# Patient Record
Sex: FEMALE | Race: WHITE | NOT HISPANIC OR LATINO | Employment: FULL TIME | URBAN - METROPOLITAN AREA
[De-identification: names, ages, dates, MRNs, and addresses within clinical notes are randomized per-mention and may not be internally consistent; named-entity substitution may affect disease eponyms.]

---

## 2024-04-11 ENCOUNTER — OFFICE VISIT (OUTPATIENT)
Dept: PULMONOLOGY | Facility: CLINIC | Age: 39
End: 2024-04-11
Payer: COMMERCIAL

## 2024-04-11 VITALS
HEIGHT: 69 IN | HEART RATE: 79 BPM | OXYGEN SATURATION: 99 % | WEIGHT: 178.13 LBS | RESPIRATION RATE: 20 BRPM | DIASTOLIC BLOOD PRESSURE: 70 MMHG | BODY MASS INDEX: 26.38 KG/M2 | SYSTOLIC BLOOD PRESSURE: 122 MMHG

## 2024-04-11 DIAGNOSIS — R06.09 DYSPNEA ON EXERTION: ICD-10-CM

## 2024-04-11 DIAGNOSIS — K21.9 GERD WITHOUT ESOPHAGITIS: Primary | ICD-10-CM

## 2024-04-11 PROCEDURE — 99204 OFFICE O/P NEW MOD 45 MIN: CPT | Mod: S$GLB,,, | Performed by: INTERNAL MEDICINE

## 2024-04-11 PROCEDURE — 99999 PR PBB SHADOW E&M-EST. PATIENT-LVL IV: CPT | Mod: PBBFAC,,, | Performed by: INTERNAL MEDICINE

## 2024-04-11 RX ORDER — SERTRALINE HYDROCHLORIDE 100 MG/1
100 TABLET, FILM COATED ORAL
COMMUNITY
Start: 2024-03-21

## 2024-04-11 RX ORDER — PANTOPRAZOLE SODIUM 40 MG/1
40 TABLET, DELAYED RELEASE ORAL EVERY MORNING
Qty: 90 TABLET | Refills: 3 | Status: SHIPPED | OUTPATIENT
Start: 2024-04-11

## 2024-04-11 RX ORDER — TRAZODONE HYDROCHLORIDE 100 MG/1
100 TABLET ORAL NIGHTLY
COMMUNITY
Start: 2024-01-31

## 2024-04-11 RX ORDER — FAMOTIDINE 40 MG/1
40 TABLET, FILM COATED ORAL NIGHTLY
Qty: 30 TABLET | Refills: 11 | Status: SHIPPED | OUTPATIENT
Start: 2024-04-11

## 2024-04-11 NOTE — PROGRESS NOTES
Subjective:      Patient ID: Jennifer Claudet is a 39 y.o. female.    Chief Complaint: Shortness of Breath    Shortness of Breath        39-year-old female status post partial thyroidectomy for papillary thyroid carcinoma with subsequent right vocal cord paralysis previously followed by Dr. Casper at our [facility].  In 2022 she had medialization and a silastic strut placed in the right vocal cord.  Was also previously seen by Dr. Frankel at [facility] for possible asthma and chronic dyspnea.  She had pulmonary function testing done at that time in 2021 which was read as mild obstructive changes.  She also has some sleep issues and had a sleep study which was essentially normal in 2022.  She is here today for another opinion regarding her dyspnea which has gotten significantly worse by her description for the past 2 years.  Dyspnea occurs both at rest and with exertion but sometimes worse at rest.  Especially worse with lying supine at night feels like she can not get a deep breath and has to forcefully inhale deeply and yawn.  Was previously treated with inhaled bronchodilators which did not help.  She is also worked with physical therapy at our [facility] for this problem without much benefit.  No associated wheezing, cough, fever, chills, chest pain.    Past Medical History:   Diagnosis Date    Anxiety     Anxiety state 11/2/2020 8:52:48 AM    [facility] Historical - Quick Add: Anxiety and depression-No Additional Notes    Anxiety state 11/2/2020 8:52:48 AM    [facility] Historical - Quick Add: Anxiety and depression-No Additional Notes    Depression     Hypothyroid     Hypothyroidism 6/12/2018 2:11:13 PM    [facility] - [facility]: Hypothyroidism-No Additional Notes    Hypothyroidism 6/12/2018 2:11:13 PM    [facility] - [facility]: Hypothyroidism-No Additional Notes    Irritable colon 11/2/2020 8:53:24 AM    [facility] Historical - Unknown: IBS (irritable bowel  syndrome)-No Additional Notes    Irritable colon 11/2/2020 8:53:24 AM    [facility] Historical - Unknown: IBS (irritable bowel syndrome)-No Additional Notes    Migraine 11/2/2020 8:53:19 AM    [facility] Historical - Other: Migraine headache-No Additional Notes    Migraine 11/2/2020 8:53:19 AM    [facility] Historical - Other: Migraine headache-No Additional Notes    Obsessive-compulsive disorder 11/2/2020 8:53:03 AM    [facility] Historical - Unknown: OCD (obsessive compulsive disorder)-No Additional Notes    Obsessive-compulsive disorder 11/2/2020 8:53:03 AM    [facility] Historical - Unknown: OCD (obsessive compulsive disorder)-No Additional Notes    Other acne 11/2/2020 8:53:33 AM    [facility] - Endocrine/Metabolic/Immune: Acne-No Additional Notes    Other acne 11/2/2020 8:53:33 AM    [facility] - Endocrine/Metabolic/Immune: Acne-No Additional Notes     Past Surgical History:   Procedure Laterality Date    cauterization of endometriosis      ENDOMETRIAL ABLATION      FOOT SURGERY      HIP SURGERY  07/2022    LAPAROSCOPY      SALPINGOOPHORECTOMY Right     THYROIDECTOMY      TUBAL LIGATION      vocal cord surgery  02/2022     Social History     Tobacco Use    Smoking status: Never    Smokeless tobacco: Never   Substance Use Topics    Alcohol use: Yes    Drug use: Never     History reviewed. No pertinent family history.    Review of Systems   Respiratory:  Positive for shortness of breath.     as per hPI otherwise negative    Objective:     Physical Exam   Constitutional: She is oriented to person, place, and time. She appears well-developed. No distress.   HENT:   Head: Normocephalic.   Cardiovascular: Normal rate and regular rhythm.   Pulmonary/Chest: Normal expansion, symmetric chest wall expansion, effort normal and breath sounds normal. She has no wheezes.   Abdominal: Soft.   Musculoskeletal:      Cervical back: Neck supple.   Neurological: She is alert and oriented to  "person, place, and time.   Psychiatric: She has a normal mood and affect.   Nursing note and vitals reviewed.          4/11/2024     8:31 AM 1/8/2024    11:04 AM 10/24/2023     2:00 PM 9/26/2023     8:56 AM 8/7/2023    10:02 AM 1/5/2023     2:57 PM 1/3/2022     2:14 PM   Pulmonary Function Tests   SpO2 99 %         Height 5' 9" (1.753 m) 5' 9" (1.753 m) 5' 9" (1.753 m) 5' 9" (1.753 m) 5' 9" (1.753 m) 5' 9" (1.753 m) 5' 9" (1.753 m)   Weight 80.8 kg (178 lb 2.1 oz) 85.3 kg (188 lb) 85.7 kg (189 lb) 85.7 kg (189 lb) 86.2 kg (190 lb) 85.3 kg (188 lb) 81.2 kg (179 lb)   BMI (Calculated) 26.3 27.8 27.9 27.9 28 27.8 26.4        Assessment:     1. GERD without esophagitis    2. Dyspnea on exertion         Orders Placed This Encounter   Procedures    FL Fluoro of Diaphragm     Standing Status:   Future     Standing Expiration Date:   4/11/2025     Order Specific Question:   Reason for Exam:     Answer:   dyspnea     Order Specific Question:   Is the patient pregnant?     Answer:   No     Order Specific Question:   May the Radiologist modify the order per protocol to meet the clinical needs of the patient?     Answer:   Yes     Order Specific Question:   Release to patient     Answer:   Immediate    Complete PFT w/ bronchodilator     Standing Status:   Future     Standing Expiration Date:   4/11/2025     Order Specific Question:   Release to patient     Answer:   Immediate    Stress test, pulmonary     Standing Status:   Future     Standing Expiration Date:   4/11/2025     Order Specific Question:   Reason for study     Answer:   Functional status     Order Specific Question:   Release to patient     Answer:   Immediate         Plan:     Dyspnea of unclear etiology  Have to wonder if she basically has lost all of her intrinsic PEEP with her vocal cord issues  Unclear if silent reflux may be playing a role as well  We will have her back in 4 weeks for PFTs, 6 minute walk test and diaphragmatic fluoroscopy  In the meantime we " will treat her empirically for reflux with Protonix q.a.m. and Pepcid q.h.s.  Discussed the above with the patient who voiced understanding and agreement with this plan

## 2024-04-16 ENCOUNTER — HOSPITAL ENCOUNTER (OUTPATIENT)
Dept: RADIOLOGY | Facility: HOSPITAL | Age: 39
Discharge: HOME | End: 2024-04-16
Attending: INTERNAL MEDICINE
Payer: COMMERCIAL

## 2024-04-16 DIAGNOSIS — R06.09 DYSPNEA ON EXERTION: ICD-10-CM

## 2024-04-16 PROCEDURE — 76000 FLUOROSCOPY <1 HR PHYS/QHP: CPT | Mod: TC

## 2024-04-16 PROCEDURE — 76000 FLUOROSCOPY <1 HR PHYS/QHP: CPT | Mod: 26,,, | Performed by: STUDENT IN AN ORGANIZED HEALTH CARE EDUCATION/TRAINING PROGRAM

## 2024-05-09 ENCOUNTER — OFFICE VISIT (OUTPATIENT)
Dept: PULMONOLOGY | Facility: CLINIC | Age: 39
End: 2024-05-09
Payer: COMMERCIAL

## 2024-05-09 ENCOUNTER — CLINICAL SUPPORT (OUTPATIENT)
Dept: PULMONOLOGY | Facility: CLINIC | Age: 39
End: 2024-05-09
Payer: COMMERCIAL

## 2024-05-09 VITALS
WEIGHT: 177 LBS | OXYGEN SATURATION: 99 % | WEIGHT: 177 LBS | HEART RATE: 88 BPM | BODY MASS INDEX: 26.22 KG/M2 | BODY MASS INDEX: 26.22 KG/M2 | DIASTOLIC BLOOD PRESSURE: 63 MMHG | SYSTOLIC BLOOD PRESSURE: 131 MMHG | HEIGHT: 69 IN | RESPIRATION RATE: 17 BRPM | HEIGHT: 69 IN

## 2024-05-09 DIAGNOSIS — G47.36 COMORBID SLEEP-RELATED HYPOVENTILATION: Primary | ICD-10-CM

## 2024-05-09 DIAGNOSIS — R06.09 DYSPNEA ON EXERTION: ICD-10-CM

## 2024-05-09 DIAGNOSIS — J45.40 MODERATE PERSISTENT ASTHMA WITHOUT COMPLICATION: ICD-10-CM

## 2024-05-09 LAB
BRPFT: NORMAL
DLCO ADJ PRE: 25.53 ML/(MIN*MMHG)
DLCO SINGLE BREATH LLN: 23.12
DLCO SINGLE BREATH PRE REF: 88.5 %
DLCO SINGLE BREATH REF: 28.86
DLCOC SBVA LLN: 3.69
DLCOC SBVA PRE REF: 100.3 %
DLCOC SBVA REF: 5.01
DLCOC SINGLE BREATH LLN: 23.12
DLCOC SINGLE BREATH PRE REF: 88.5 %
DLCOC SINGLE BREATH REF: 28.86
DLCOVA LLN: 3.69
DLCOVA PRE REF: 100.3 %
DLCOVA PRE: 5.03 ML/(MIN*MMHG*L)
DLCOVA REF: 5.01
DLVAADJ PRE: 5.03 ML/(MIN*MMHG*L)
ERV LLN: -16448.83
ERV PRE REF: 78.5 %
ERV REF: 1.17
FEF 25 75 CHG: 69.5 %
FEF 25 75 LLN: 2.18
FEF 25 75 POST REF: 53.6 %
FEF 25 75 PRE REF: 31.6 %
FEF 25 75 REF: 3.57
FET100 CHG: -25.7 %
FEV1 CHG: 16.2 %
FEV1 FVC CHG: 21.2 %
FEV1 FVC LLN: 71
FEV1 FVC POST REF: 84.4 %
FEV1 FVC PRE REF: 69.6 %
FEV1 FVC REF: 82
FEV1 LLN: 2.83
FEV1 POST REF: 76.6 %
FEV1 PRE REF: 66 %
FEV1 REF: 3.55
FRCPLETH LLN: 2.14
FRCPLETH PREREF: 125.8 %
FRCPLETH REF: 2.96
FVC CHG: -4.1 %
FVC LLN: 3.49
FVC POST REF: 90.1 %
FVC PRE REF: 94 %
FVC REF: 4.37
IVC PRE: 3.62 L
IVC SINGLE BREATH LLN: 3.49
IVC SINGLE BREATH PRE REF: 82.9 %
IVC SINGLE BREATH REF: 4.37
MVV LLN: 113
MVV PRE REF: 55.4 %
MVV REF: 134
PEF CHG: -2.2 %
PEF LLN: 5.78
PEF POST REF: 66.1 %
PEF PRE REF: 67.5 %
PEF REF: 7.76
POST FEF 25 75: 1.91 L/S
POST FET 100: 8.84 SEC
POST FEV1 FVC: 69.12 %
POST FEV1: 2.72 L
POST FVC: 3.94 L
POST PEF: 5.13 L/S
PRE DLCO: 25.53 ML/(MIN*MMHG)
PRE ERV: 0.92 L
PRE FEF 25 75: 1.13 L/S
PRE FET 100: 11.9 SEC
PRE FEV1 FVC: 57.04 %
PRE FEV1: 2.34 L
PRE FRC PL: 3.72 L
PRE FVC: 4.11 L
PRE MVV: 74 L/MIN
PRE PEF: 5.24 L/S
PRE RV: 2.16 L
PRE TLC: 6.27 L
RAW LLN: 3.06
RAW PRE REF: 169.2 %
RAW PRE: 5.17 CMH2O*S/L
RAW REF: 3.06
RV LLN: 1.22
RV PRE REF: 120.8 %
RV REF: 1.79
RVTLC LLN: 23
RVTLC PRE REF: 107.1 %
RVTLC PRE: 34.5 %
RVTLC REF: 32
TLC LLN: 4.77
TLC PRE REF: 108.9 %
TLC REF: 5.76
VA PRE: 5.08 L
VA SINGLE BREATH LLN: 5.61
VA SINGLE BREATH PRE REF: 90.6 %
VA SINGLE BREATH REF: 5.61
VC LLN: 3.49
VC PRE REF: 94 %
VC PRE: 4.11 L
VC REF: 4.37
VTGRAWPRE: 4.13 L

## 2024-05-09 PROCEDURE — 94060 EVALUATION OF WHEEZING: CPT | Mod: 59,S$GLB,, | Performed by: INTERNAL MEDICINE

## 2024-05-09 PROCEDURE — 99999 PR PBB SHADOW E&M-EST. PATIENT-LVL II: CPT | Mod: PBBFAC,,,

## 2024-05-09 PROCEDURE — 99999 PR PBB SHADOW E&M-EST. PATIENT-LVL I: CPT | Mod: PBBFAC,,,

## 2024-05-09 PROCEDURE — 99999 PR PBB SHADOW E&M-EST. PATIENT-LVL III: CPT | Mod: PBBFAC,,, | Performed by: INTERNAL MEDICINE

## 2024-05-09 PROCEDURE — 94618 PULMONARY STRESS TESTING: CPT | Mod: S$GLB,,, | Performed by: INTERNAL MEDICINE

## 2024-05-09 PROCEDURE — 94726 PLETHYSMOGRAPHY LUNG VOLUMES: CPT | Mod: S$GLB,,, | Performed by: INTERNAL MEDICINE

## 2024-05-09 PROCEDURE — 94729 DIFFUSING CAPACITY: CPT | Mod: S$GLB,,, | Performed by: INTERNAL MEDICINE

## 2024-05-09 PROCEDURE — 99214 OFFICE O/P EST MOD 30 MIN: CPT | Mod: 25,S$GLB,, | Performed by: INTERNAL MEDICINE

## 2024-05-09 RX ORDER — HYDROXYZINE PAMOATE 25 MG/1
25 CAPSULE ORAL 2 TIMES DAILY
COMMUNITY
Start: 2024-05-06

## 2024-05-09 RX ORDER — ESZOPICLONE 1 MG/1
1 TABLET, FILM COATED ORAL NIGHTLY
COMMUNITY
Start: 2024-05-06

## 2024-05-09 RX ORDER — FLUTICASONE PROPIONATE AND SALMETEROL 250; 50 UG/1; UG/1
1 POWDER RESPIRATORY (INHALATION) 2 TIMES DAILY
Qty: 60 EACH | Refills: 6 | Status: SHIPPED | OUTPATIENT
Start: 2024-05-09

## 2024-05-09 NOTE — PROGRESS NOTES
Subjective:      Patient ID: Jennifer Claudet is a 39 y.o. female.    Chief Complaint: Shortness of Breath    Shortness of Breath        April 2024:  39-year-old female status post partial thyroidectomy for papillary thyroid carcinoma with subsequent right vocal cord paralysis previously followed by Dr. Casper at our [facility].  In 2022 she had medialization and a silastic strut placed in the right vocal cord.  Was also previously seen by Dr. Frankel at [facility] for possible asthma and chronic dyspnea.  She had pulmonary function testing done at that time in 2021 which was read as mild obstructive changes.  She also has some sleep issues and had a sleep study which was essentially normal in 2022.  She is here today for another opinion regarding her dyspnea which has gotten significantly worse by her description for the past 2 years.  Dyspnea occurs both at rest and with exertion but sometimes worse at rest.  Especially worse with lying supine at night feels like she can not get a deep breath and has to forcefully inhale deeply and yawn.  Was previously treated with inhaled bronchodilators which did not help.  She is also worked with physical therapy at our [facility] for this problem without much benefit.  No associated wheezing, cough, fever, chills, chest pain.     May 2024  Here today for follow up with the above  PFTs done today show moderate obstruction with significant improvement post-bronchodilator  Patient states that she thinks that treatment of reflux is helping somewhat with nocturnal symptoms  6 minute walk test done today showed adequate cardiovascular response with no desaturations and normal exercise capacity    Review of Systems   Respiratory:  Positive for shortness of breath.     as per history of present illness otherwise negative  Objective:     Physical Exam   Constitutional: She is oriented to person, place, and time. She appears well-developed. No distress.   HENT:    Head: Normocephalic.   Cardiovascular: Normal rate and regular rhythm.   Pulmonary/Chest: Normal expansion, symmetric chest wall expansion, effort normal and breath sounds normal.   Abdominal: Soft.   Musculoskeletal:      Cervical back: Neck supple.   Neurological: She is alert and oriented to person, place, and time.   Psychiatric: She has a normal mood and affect.   Nursing note and vitals reviewed.         Assessment:     1. Comorbid sleep-related hypoventilation    2. Moderate persistent asthma without complication         Orders Placed This Encounter   Procedures    CPAP FOR HOME USE     Order Specific Question:   Length of need (1-99 months):     Answer:   99     Order Specific Question:   Fulfillment Priority:     Answer:   Level 4:  all others     Order Specific Question:   Type ():     Answer:   Auto CPAP     Order Specific Question:   Auto CPAP pressure setting range (cmH20):     Answer:   5-15     Order Specific Question:   Humidification ():     Answer:   Heated     Order Specific Question:   Choose ONE mask type and its corresponding cushions and/or pillows:     Answer:    Full Face Mask, 1 per 90 days:  Full Face Cushion, (3 per 90 days)     Order Specific Question:   Choose EITHER Heated or Non-Heated Tubjing     Answer:    Non-Heated Tubing, 1 per 90 days     Order Specific Question:   Number of Days Needed:     Answer:   90     Order Specific Question:   All other supplies as needed as listed below:     Answer:    Non-Disposable Filter, 1 per 180 days     Order Specific Question:   All other supplies as needed as listed below:     Answer:    Humidifier Chamber, 1 per 180 days     Order Specific Question:   DME Agency:     Answer:   [facility]     I personally reviewed pulmonary function tests and 6 minute walk test data.  My interpretation is as per history of present illness    I personally reviewed diaphragmatic fluoroscopy images.  This  shows normal findings    Plan:     Dyspnea is multifactorial  There certainly seems to be an asthmatic component and for that I will start her on Breo 250 b.i.d.  Think a large part of her breathlessness is due to loss of intrinsic PEEP due to her vocal cord issues  This is exemplified by the worsening of her symptoms in the supine position  I also believe CPAP nightly would be very effective treatment for this condition  I have ordered an auto titrating CPAP  She has had prior sleep studies that showed no sleep apnea but that would not be her primary problem.  It may take some explaining to insurance coverage to get this covered  Continue treatment for reflux as well  I have discussed all of the above in detail with the patient who voiced understanding and agreement with this plan  I will see her back in 3 months, sooner if needed

## 2024-05-09 NOTE — PROCEDURES
"[facility] 3rdFl  Six Minute Walk     SUMMARY     Ordering Provider: Jairo Nelson MD   Interpreting Provider: Jairo Nelson MD  Performing nurse/tech/RT: VT, RT  Diagnosis:  (Dyspnea on exertion)  Height: 5' 9" (175.3 cm)  Weight: 80.3 kg (177 lb 0.5 oz)  BMI (Calculated): 26.1   Patient Race:             Phase Oxygen Assessment Supplemental O2 Heart   Rate Blood Pressure Yadi Dyspnea Scale Rating   Resting 96 % Room Air 90 bpm 125/60 2   Exercise        Minute        1 97 % Room Air 110 bpm     2 97 % Room Air 114 bpm     3 97 % Room Air 115 bpm     4 97 % Room Air 116 bpm     5 97 % Room Air 117 bpm     6  97 % Room Air 125 bpm 131/63 2   Recovery        Minute        1 98 % Room Air 89 bpm     2 98 % Room Air 84 bpm     3 98 % Room Air 89 bpm     4 98 % Room Air 89 bpm 119/65 1     Six Minute Walk Summary  6MWT Status: completed without stopping  Patient Reported: Dyspnea     Interpretation:  Did the patient stop or pause?: No                                         Total Time Walked (Calculated): 360 seconds  Final Partial Lap Distance (feet): 0 feet  Total Distance Meters (Calculated): 548.64 meters  Predicted Distance Meters (Calculated): 627.58 meters  Percentage of Predicted (Calculated): 87.42  Peak VO2 (Calculated): 20.44  Mets: 5.84  Has The Patient Had a Previous Six Minute Walk Test?: No       Previous 6MWT Results  Has The Patient Had a Previous Six Minute Walk Test?: No      "

## 2024-05-10 ENCOUNTER — PATIENT MESSAGE (OUTPATIENT)
Dept: PULMONOLOGY | Facility: CLINIC | Age: 39
End: 2024-05-10
Payer: COMMERCIAL

## 2024-05-14 DIAGNOSIS — J45.30 MILD PERSISTENT ASTHMA WITHOUT COMPLICATION: Primary | ICD-10-CM

## 2024-05-29 ENCOUNTER — PATIENT MESSAGE (OUTPATIENT)
Dept: PULMONOLOGY | Facility: CLINIC | Age: 39
End: 2024-05-29
Payer: COMMERCIAL

## 2024-06-04 ENCOUNTER — TELEPHONE (OUTPATIENT)
Dept: PULMONOLOGY | Facility: CLINIC | Age: 39
End: 2024-06-04
Payer: COMMERCIAL

## 2024-06-04 NOTE — TELEPHONE ENCOUNTER
Lvm for pt to call us back, due to [facility] not accepting her insurance , orders sent to [facility]. They are requesting additional information for dx other than sleep apnea. LOV and orders printed waiting for advice on how pt would like to proceed.

## 2024-06-04 NOTE — TELEPHONE ENCOUNTER
Spoke with pt , Orders and LOV notes along with a side note : (Pt does not have sleep apnea. This Rx for Cpap machine is for loss of intrinsic PEEP due to vocal cord issues.) all faxed to [facility] @ [phone]. Fax confirmation received.

## 2024-06-12 ENCOUNTER — TELEPHONE (OUTPATIENT)
Dept: PULMONOLOGY | Facility: CLINIC | Age: 39
End: 2024-06-12
Payer: COMMERCIAL

## 2024-06-12 ENCOUNTER — PATIENT MESSAGE (OUTPATIENT)
Dept: PULMONOLOGY | Facility: CLINIC | Age: 39
End: 2024-06-12
Payer: COMMERCIAL

## 2024-06-12 NOTE — TELEPHONE ENCOUNTER
Called and spoke with [facility] @ [phone] , She stated that they need a copy of the 2022 sleep study that shows that the patient does not have sleep apnea, before the insurance company will go any further in processing her claim for a cpap machine. Relayed information to patient and she is going to send it to me through [employer] to fax over to [facility].

## 2024-07-24 ENCOUNTER — PATIENT MESSAGE (OUTPATIENT)
Dept: DIABETES | Facility: CLINIC | Age: 39
End: 2024-07-24
Payer: COMMERCIAL

## 2024-10-10 ENCOUNTER — OFFICE VISIT (OUTPATIENT)
Dept: PULMONOLOGY | Facility: CLINIC | Age: 39
End: 2024-10-10
Payer: COMMERCIAL

## 2024-10-10 VITALS
SYSTOLIC BLOOD PRESSURE: 120 MMHG | DIASTOLIC BLOOD PRESSURE: 80 MMHG | OXYGEN SATURATION: 98 % | RESPIRATION RATE: 16 BRPM | WEIGHT: 169.56 LBS | HEART RATE: 85 BPM | BODY MASS INDEX: 25.11 KG/M2 | HEIGHT: 69 IN

## 2024-10-10 DIAGNOSIS — G47.30 SLEEP-DISORDERED BREATHING: Chronic | ICD-10-CM

## 2024-10-10 DIAGNOSIS — J45.40 MODERATE PERSISTENT ASTHMA WITHOUT COMPLICATION: Primary | Chronic | ICD-10-CM

## 2024-10-10 DIAGNOSIS — J38.00 VOCAL CORD PARALYSIS: Chronic | ICD-10-CM

## 2024-10-10 PROCEDURE — 99999 PR PBB SHADOW E&M-EST. PATIENT-LVL III: CPT | Mod: PBBFAC,,, | Performed by: INTERNAL MEDICINE

## 2024-10-10 PROCEDURE — 99213 OFFICE O/P EST LOW 20 MIN: CPT | Mod: S$GLB,,, | Performed by: INTERNAL MEDICINE

## 2024-10-10 RX ORDER — ERGOCALCIFEROL 1.25 MG/1
50000 CAPSULE ORAL
COMMUNITY
Start: 2024-08-20 | End: 2025-02-16

## 2024-10-10 NOTE — PROGRESS NOTES
Subjective:      Patient ID: Jennifer Claudet is a 39 y.o. female.    Chief Complaint: Shortness of Breath    Shortness of Breath        April 2024:  39-year-old female status post partial thyroidectomy for papillary thyroid carcinoma with subsequent right vocal cord paralysis previously followed by Dr. Casper at our [facility].  In 2022 she had medialization and a silastic strut placed in the right vocal cord.  Was also previously seen by Dr. Frankel at [facility] for possible asthma and chronic dyspnea.  She had pulmonary function testing done at that time in 2021 which was read as mild obstructive changes.  She also has some sleep issues and had a sleep study which was essentially normal in 2022.  She is here today for another opinion regarding her dyspnea which has gotten significantly worse by her description for the past 2 years.  Dyspnea occurs both at rest and with exertion but sometimes worse at rest.  Especially worse with lying supine at night feels like she can not get a deep breath and has to forcefully inhale deeply and yawn.  Was previously treated with inhaled bronchodilators which did not help.  She is also worked with physical therapy at our [facility] for this problem without much benefit.  No associated wheezing, cough, fever, chills, chest pain.      May 2024  Here today for follow up with the above  PFTs done today show moderate obstruction with significant improvement post-bronchodilator  Patient states that she thinks that treatment of reflux is helping somewhat with nocturnal symptoms  6 minute walk test done today showed adequate cardiovascular response with no desaturations and normal exercise capacity    October 2024  Here for follow up with the above  Went back to [place] for daily controller therapy in his doing well with this  Got her CPAP machine with nasal pillars in August and has been wearing nightly since that time  She endorses estimated 80-90% compliance as  well as significant clinical benefit  No new pulmonary complaints    Review of Systems   Respiratory:  Positive for shortness of breath.     as per history of present illness otherwise negative  Objective:     Physical Exam   Constitutional: She is oriented to person, place, and time. She appears well-developed. No distress.   HENT:   Head: Normocephalic.   Cardiovascular: Normal rate and regular rhythm.   Pulmonary/Chest: Normal expansion, symmetric chest wall expansion, effort normal and breath sounds normal. She has no wheezes.   Musculoskeletal:      Cervical back: Neck supple.   Neurological: She is alert and oriented to person, place, and time.   Psychiatric: She has a normal mood and affect.   Nursing note and vitals reviewed.        Assessment:     1. Moderate persistent asthma without complication    2. Vocal cord paralysis    3. Sleep-disordered breathing        Plan:     Asthma is well controlled on current regimen, continue this  Continue to follow up with the ENT as needed for her vocal cord issues  Getting significant benefit from nightly CPAP  Her sleep disordered breathing is due to her vocal cord paralysis and loss of intrinsic PEEP especially in the supine position  Return in 1 year, sooner if needed  Discussed the above in detail with the patient who voiced understanding and agreement with this plan

## 2024-12-12 ENCOUNTER — PATIENT MESSAGE (OUTPATIENT)
Dept: RESEARCH | Facility: HOSPITAL | Age: 39
End: 2024-12-12
Payer: COMMERCIAL

## 2025-03-11 ENCOUNTER — PATIENT MESSAGE (OUTPATIENT)
Dept: PULMONOLOGY | Facility: CLINIC | Age: 40
End: 2025-03-11
Payer: COMMERCIAL

## 2025-03-11 DIAGNOSIS — J45.40 MODERATE PERSISTENT ASTHMA WITHOUT COMPLICATION: ICD-10-CM

## 2025-03-11 RX ORDER — FLUTICASONE PROPIONATE AND SALMETEROL 250; 50 UG/1; UG/1
1 POWDER RESPIRATORY (INHALATION) 2 TIMES DAILY
Qty: 60 EACH | Refills: 6 | Status: SHIPPED | OUTPATIENT
Start: 2025-03-11